# Patient Record
Sex: FEMALE | Race: WHITE
[De-identification: names, ages, dates, MRNs, and addresses within clinical notes are randomized per-mention and may not be internally consistent; named-entity substitution may affect disease eponyms.]

---

## 2018-10-05 ENCOUNTER — HOSPITAL ENCOUNTER (EMERGENCY)
Dept: HOSPITAL 74 - JER | Age: 53
Discharge: HOME | End: 2018-10-05
Payer: MEDICARE

## 2018-10-05 VITALS — DIASTOLIC BLOOD PRESSURE: 86 MMHG | TEMPERATURE: 98.5 F | HEART RATE: 87 BPM | SYSTOLIC BLOOD PRESSURE: 146 MMHG

## 2018-10-05 VITALS — BODY MASS INDEX: 29 KG/M2

## 2018-10-05 DIAGNOSIS — R10.9: Primary | ICD-10-CM

## 2018-10-05 DIAGNOSIS — G90.50: ICD-10-CM

## 2018-10-05 LAB
ALBUMIN SERPL-MCNC: 4.2 G/DL (ref 3.4–5)
ALP SERPL-CCNC: 133 U/L (ref 45–117)
ALT SERPL-CCNC: 62 U/L (ref 13–61)
ANION GAP SERPL CALC-SCNC: 6 MMOL/L (ref 8–16)
APPEARANCE UR: CLEAR
AST SERPL-CCNC: 33 U/L (ref 15–37)
BASOPHILS # BLD: 2.6 % (ref 0–2)
BILIRUB SERPL-MCNC: 0.5 MG/DL (ref 0.2–1)
BILIRUB UR STRIP.AUTO-MCNC: NEGATIVE MG/DL
BUN SERPL-MCNC: 10 MG/DL (ref 7–18)
CALCIUM SERPL-MCNC: 9.4 MG/DL (ref 8.5–10.1)
CHLORIDE SERPL-SCNC: 107 MMOL/L (ref 98–107)
CO2 SERPL-SCNC: 28 MMOL/L (ref 21–32)
COLOR UR: (no result)
CREAT SERPL-MCNC: 0.8 MG/DL (ref 0.55–1.3)
DEPRECATED RDW RBC AUTO: 13.1 % (ref 11.6–15.6)
EOSINOPHIL # BLD: 2 % (ref 0–4.5)
EPITH CASTS URNS QL MICRO: (no result) /HPF
GLUCOSE SERPL-MCNC: 78 MG/DL (ref 74–106)
HCT VFR BLD CALC: 39.9 % (ref 32.4–45.2)
HGB BLD-MCNC: 13.5 GM/DL (ref 10.7–15.3)
INR BLD: 0.97 (ref 0.83–1.09)
KETONES UR QL STRIP: NEGATIVE
LEUKOCYTE ESTERASE UR QL STRIP.AUTO: NEGATIVE
LIPASE SERPL-CCNC: 109 U/L (ref 73–393)
LYMPHOCYTES # BLD: 34.7 % (ref 8–40)
MAGNESIUM SERPL-MCNC: 2.1 MG/DL (ref 1.8–2.4)
MCH RBC QN AUTO: 31 PG (ref 25.7–33.7)
MCHC RBC AUTO-ENTMCNC: 33.9 G/DL (ref 32–36)
MCV RBC: 91.2 FL (ref 80–96)
MONOCYTES # BLD AUTO: 8 % (ref 3.8–10.2)
MUCOUS THREADS URNS QL MICRO: (no result)
NEUTROPHILS # BLD: 52.7 % (ref 42.8–82.8)
NITRITE UR QL STRIP: NEGATIVE
PH BLDV: 7.36 [PH] (ref 7.32–7.42)
PH UR: 5 [PH] (ref 5–8)
PHOSPHATE SERPL-MCNC: 3.4 MG/DL (ref 2.5–4.9)
PLATELET # BLD AUTO: 257 K/MM3 (ref 134–434)
PMV BLD: 9.2 FL (ref 7.5–11.1)
POTASSIUM SERPLBLD-SCNC: 3.9 MMOL/L (ref 3.5–5.1)
PROT SERPL-MCNC: 7.8 G/DL (ref 6.4–8.2)
PROT UR QL STRIP: NEGATIVE
PROT UR QL STRIP: NEGATIVE
PT PNL PPP: 11.5 SEC (ref 9.7–13)
RBC # BLD AUTO: 4.37 M/MM3 (ref 3.6–5.2)
SODIUM SERPL-SCNC: 141 MMOL/L (ref 136–145)
SP GR UR: 1.02 (ref 1.01–1.03)
UROBILINOGEN UR STRIP-MCNC: NEGATIVE MG/DL (ref 0.2–1)
VENOUS PC02: 46.5 MMHG (ref 38–52)
VENOUS PO2: 27.8 MMHG (ref 28–48)
WBC # BLD AUTO: 5.3 K/MM3 (ref 4–10)

## 2018-10-05 PROCEDURE — 3E033NZ INTRODUCTION OF ANALGESICS, HYPNOTICS, SEDATIVES INTO PERIPHERAL VEIN, PERCUTANEOUS APPROACH: ICD-10-PCS | Performed by: EMERGENCY MEDICINE

## 2018-10-05 NOTE — PDOC
History of Present Illness





- General


Chief Complaint: Pain


Stated Complaint: PCP SENT/PAIN


Time Seen by Provider: 10/05/18 16:58


History Source: Patient


Exam Limitations: No Limitations





- History of Present Illness


Initial Comments: 





53 y/o F w/PMH of Complex regional pain syndrome, spinal disc herniations, 

nephrolithiasis presents to the ER with RUQ pain. RUQ started 1 month ago, 

worsened over the last 2 weeks and developed chills since yesterday. The pain 

moves from the RUQ to her back, is 10/10 in intensity, worse with food, and 

constant otherwise. Initially she thought it was due to her complex regional 

pain disorder and did not seek help. She went to Urgent care yesterday and was 

told to come to the ER but she was unable to make it until today. She also has 

diarrhea x3 days with no blood in stool. Denies chest pressure, SOB, sick 

contacts, dysuria, blood in urine, LE edema, vomiting. This is the first time 

she has felt this kind of pain.








PMH: Complex regional pain syndrome


PSHx: Multiple orthopedic surgeries. No abd surgeries reported


SH: denies smoking. Social alcohol use. Denies drug use.


Allergies: Codeine (itching), cortisone (itching)


Meds: Nexium, Flexeril, Morphine 15 mg po qhs, Gabapentin











Past History





- Past Medical History


Allergies/Adverse Reactions: 


 Allergies











Allergy/AdvReac Type Severity Reaction Status Date / Time


 


codeine [Codeine] Allergy  ITCHING/DERIK Verified 10/05/18 16:58





   H  


 


cortisone [Cortisone] Allergy  ITCHING Verified 10/05/18 16:58





   RASH  











Home Medications: 


Ambulatory Orders





Bisacodyl [Bisacodyl -] 5 mg PO TID PRN #0 tablet. 10/08/13 


Cyclobenzaprine HCl [Flexeril] 5 mg PO HS #0 tablet 10/08/13 


Esomeprazole Mag Trihydrate [Nexium] 40 mg PO DAILY #0 capsule.ec 10/08/13 


HYDROmorphone [Dilaudid -] 2 mg PO BID #0 tablet 10/08/13 


Zolpidem Tartrate [Ambien] 10 mg PO HS #0 tablet 10/08/13 


Naprosyn  10/05/18 


Neurontin  10/05/18 








Anemia: Yes


Cancer: No


CVA: No


COPD: No


Diabetes: No


GI Disorders: Yes


HTN: No





- Surgical History


Orthopedic Surgery: Yes (multiple on wrists shoulder knees bilaterally ankle)





- Reproductive History


 (#): 5


Para: 4





- Suicide/Smoking/Psychosocial Hx


Smoking Status: No


Smoking History: Never smoked


Have you smoked in the past 12 months: No


Number of Cigarettes Smoked Daily: 2


Information on smoking cessation initiated: No


Hx Alcohol Use: No


Drug/Substance Use Hx: No


Substance Use Type: None


Hx Substance Use Treatment: No





**Review of Systems





- Review of Systems


Able to Perform ROS?: Yes


Constitutional: Yes: Chills.  No: Fever


Respiratory: No: Shortness of Breath


Cardiac (ROS): No: Chest Pain


ABD/GI: Yes: Symptoms Reported (Abd pain in RUQ), Diarrhea, Nausea.  No: Blood 

Streaked Bowels, Vomiting, Tarry Stools


: No: Burning, Dysuria, Frequency





*Physical Exam





- Vital Signs


 Last Vital Signs











Temp Pulse Resp BP Pulse Ox


 


 98.5 F   87   20   146/86   100 


 


 10/05/18 16:58  10/05/18 16:58  10/05/18 16:58  10/05/18 16:58  10/05/18 16:58














- Physical Exam


General Appearance: Yes: Appropriately Dressed, Mild Distress (Shaking)


HEENT: positive: EOMI, Normal Voice


Respiratory/Chest: positive: Wheezing (Mild).  negative: Respiratory Distress, 

Crackles, Rhonchi


Cardiovascular: positive: Regular Rhythm, Regular Rate, S1, S2.  negative: 

Murmur


Gastrointestinal/Abdominal: positive: Normal Bowel Sounds, Tender (RUQ), Soft, 

Other (+Niota sign)


Musculoskeletal: negative: CVA Tenderness


Extremity: negative: Pedal Edema


Neurologic: positive: Fully Oriented, Alert, Normal Mood/Affect





ED Treatment Course





- LABORATORY


CBC & Chemistry Diagram: 


 10/05/18 17:19





 10/05/18 17:21





- ADDITIONAL ORDERS


Additional order review: 


 Laboratory  Results











  10/05/18 10/05/18 10/05/18





  17:21 17:21 17:21


 


PT with INR    11.50


 


INR    0.97


 


VBG pH   7.36 


 


POC VBG pCO2   46.5 


 


POC VBG pO2   27.8 L 


 


Mixed VBG HCO3   25.9 H 


 


Sodium  141  


 


Potassium  3.9  


 


Chloride  107  


 


Carbon Dioxide  28  


 


Anion Gap  6 L  


 


BUN  10  


 


Creatinine  0.8  


 


Creat Clearance w eGFR  > 60  


 


Random Glucose  78  


 


Calcium  9.4  


 


Phosphorus  3.4  


 


Magnesium  2.1  


 


Total Bilirubin  0.5  


 


AST  33  


 


ALT  62 H  


 


Alkaline Phosphatase  133 H  


 


Creatine Kinase  92  


 


Troponin I  < 0.02  


 


Total Protein  7.8  


 


Albumin  4.2  


 


Lipase  109  








 











  10/05/18





  17:19


 


RBC  4.37


 


MCV  91.2


 


MCHC  33.9


 


RDW  13.1  D


 


MPV  9.2


 


Neutrophils %  52.7


 


Lymphocytes %  34.7


 


Monocytes %  8.0


 


Eosinophils %  2.0


 


Basophils %  2.6 H














- Medications


Given in the ED: 


ED Medications














Discontinued Medications














Generic Name Dose Route Start Last Admin





  Trade Name Isaac  PRN Reason Stop Dose Admin


 


Morphine Sulfate  4 mg  10/05/18 18:42  10/05/18 18:50





  Morphine Injection -  IVPUSH  10/05/18 18:43  4 mg





  ONCE ONE   Administration





     





     





     





     














Medical Decision Making





- Medical Decision Making


10/05/18 19:05


Pt with RUQ pain on palpation. Cleary's positive. ALP at 133, slightly high. 

Lipase wnl.


Awaiting abd U/S





Pt given 4 mg IV morphine for pain








10/05/18 19:20


Abd U/S with no signs of acute cholecystitis. CBD 0.4 cm and unremarkable. No 

nephrolithiasis seen.











10/05/18 19:37


Pt's pain likely secondary to her complex regional pain syndrome. Will 

discharge home. Pt to follow up with her PCP w/in 1 week. To return to the ER 

if worsening of current symptoms or onset of new concerning symptoms.








*DC/Admit/Observation/Transfer


Diagnosis at time of Disposition: 


 Abdominal pain








- Discharge Dispostion


Disposition: HOME


Condition at time of disposition: Stable


Decision to Admit order: No





- Referrals





- Patient Instructions


Printed Discharge Instructions:  DI for Abdominal Pain-Adult


Additional Instructions: 


Follow up with your primary care physician within 1 week.





If you develop worsening of your current symptoms or new concerning symptoms 

please come back the emergency room.





- Post Discharge Activity

## 2018-10-05 NOTE — PDOC
Attending Attestation





- Resident


Resident Name: Sudhakar Vickers





- ED Attending Attestation


I have performed the following: I have examined & evaluated the patient, The 

case was reviewed & discussed with the resident, I agree w/resident's findings 

& plan





- HPI


HPI: 





10/05/18 19:37


The patient is a 52 year old female, with a significant past medical history of 

complex regional pain syndrome, nephrolithiasis, who presents to the emergency 

department with, 1 month of worsening right upper quadrant pain. As per patient

, the pain has been worsening over the past 2 weeks with associated chills and 

diarrhea yesterday. She describes her pain as 10/10, worsening after eating, 

constant, and radiating to her back. Patient went to urgent care yesterday and 

was advised to report to the ED for further evaluation. 





She denies recent fevers, headache or dizziness. She denies recent nausea, vomit

, or constipation. She denies recent  dysuria, frequency, urgency or hematuria. 

She denies recent chest pain or shortness of breath.





Allergies: codeine, cortisone








Attestations





- Attestations





10/05/18 19:38





Documentation prepared by Francine Fuchs, acting as medical scribe for 

Rosenda Ivan MD.

## 2018-10-05 NOTE — PDOC
Rapid Medical Evaluation


Time Seen by Provider: 10/05/18 16:58


Medical Evaluation: 


 Allergies











Allergy/AdvReac Type Severity Reaction Status Date / Time


 


codeine [Codeine] Allergy  ITCHING/DERIK Verified 10/03/13 13:19





   H  


 


cortisone [Cortisone] Allergy  ITCHING Verified 10/03/13 13:19





   RASH  











10/05/18 16:58


I have performed a brief in person evaluation of this patient





The patient presents with a chief complaint of 2 months of 10/10 RUQ pain 

radiating to the back and R leg. (+)diarrhea for 2 days. No urinary symptoms (+)

h/o kidney stones





Pertinent physical exam findings: In mild distress from pain, unlabored 

breathing.





I have ordered the following: CBC, CMP, Lipase, UA, Ucx, PT/INR, RUQ sono. R 

kidney sono





The patient will proceed to the ED for further eval





10/05/18 17:06








**Discharge Disposition





- Diagnosis


 Abdominal pain








- Referrals





- Patient Instructions





- Post Discharge Activity

## 2020-12-08 ENCOUNTER — HOSPITAL ENCOUNTER (OUTPATIENT)
Dept: HOSPITAL 74 - JASU-SURG | Age: 55
Discharge: HOME | End: 2020-12-08
Attending: PHYSICAL MEDICINE & REHABILITATION
Payer: COMMERCIAL

## 2020-12-08 VITALS — DIASTOLIC BLOOD PRESSURE: 60 MMHG | SYSTOLIC BLOOD PRESSURE: 119 MMHG | TEMPERATURE: 97 F | HEART RATE: 84 BPM

## 2020-12-08 VITALS — BODY MASS INDEX: 30.7 KG/M2

## 2020-12-08 DIAGNOSIS — M16.12: Primary | ICD-10-CM

## 2020-12-08 DIAGNOSIS — M25.552: ICD-10-CM

## 2020-12-08 PROCEDURE — 3E0U33Z INTRODUCTION OF ANTI-INFLAMMATORY INTO JOINTS, PERCUTANEOUS APPROACH: ICD-10-PCS | Performed by: PHYSICAL MEDICINE & REHABILITATION
